# Patient Record
Sex: MALE | Race: WHITE | NOT HISPANIC OR LATINO | Employment: PART TIME | URBAN - METROPOLITAN AREA
[De-identification: names, ages, dates, MRNs, and addresses within clinical notes are randomized per-mention and may not be internally consistent; named-entity substitution may affect disease eponyms.]

---

## 2018-07-26 ENCOUNTER — OFFICE VISIT (OUTPATIENT)
Dept: MEDICAL GROUP | Facility: MEDICAL CENTER | Age: 28
End: 2018-07-26
Attending: FAMILY MEDICINE
Payer: MEDICAID

## 2018-07-26 VITALS
DIASTOLIC BLOOD PRESSURE: 56 MMHG | HEART RATE: 68 BPM | RESPIRATION RATE: 16 BRPM | WEIGHT: 170 LBS | SYSTOLIC BLOOD PRESSURE: 110 MMHG | OXYGEN SATURATION: 96 % | TEMPERATURE: 98.2 F | BODY MASS INDEX: 20.7 KG/M2 | HEIGHT: 76 IN

## 2018-07-26 DIAGNOSIS — Z85.820 HISTORY OF MELANOMA EXCISION: ICD-10-CM

## 2018-07-26 DIAGNOSIS — Z00.00 HEALTHCARE MAINTENANCE: ICD-10-CM

## 2018-07-26 DIAGNOSIS — Z87.438 HISTORY OF HYDROCELE: ICD-10-CM

## 2018-07-26 DIAGNOSIS — F81.9 LEARNING DISABILITY: ICD-10-CM

## 2018-07-26 DIAGNOSIS — Z98.890 HISTORY OF MELANOMA EXCISION: ICD-10-CM

## 2018-07-26 PROCEDURE — 99202 OFFICE O/P NEW SF 15 MIN: CPT | Performed by: FAMILY MEDICINE

## 2018-07-26 PROCEDURE — 99203 OFFICE O/P NEW LOW 30 MIN: CPT | Performed by: FAMILY MEDICINE

## 2018-07-26 ASSESSMENT — PAIN SCALES - GENERAL: PAINLEVEL: NO PAIN

## 2018-07-26 ASSESSMENT — PATIENT HEALTH QUESTIONNAIRE - PHQ9: CLINICAL INTERPRETATION OF PHQ2 SCORE: 0

## 2018-07-27 NOTE — PROGRESS NOTES
Chief Complaint   Patient presents with   • Establish Care         HISTORY OF THE PRESENT ILLNESS: Patient is a 28 y.o. male. This pleasant patient is here today to establish care, and follow-up on previous malignant melanoma with a request to evaluate a new nevus, and learning disability.      History of melanoma excision  Patient underwent excision of a nodular melanoma from his left shoulder in  2007. He had several annual follow-up exams with dermatology in the subsequent 2 years but has not had medical care since. Patient did notice a new nevus on the lateral right ankle about 6 months ago. It has not changed in size shape or color. His weight is stable. There are no skin rashes.    Learning disability  Patient was diagnosed at approximately age 5 with a learning disability. This resulted in him being declared disabled.. He has some trouble with language and speaking but can clearly use words in sentences to express himself. He is currently working at [employer]      Social history-patient lives with his parents, is working in  Allergies: Patient has no known allergies.    No current [facility]-ordered outpatient prescriptions on file.     No current [facility]-ordered facility-administered medications on file.        Past Medical History:   Diagnosis Date   • Learning disability 5 th grade   • Nodular melanoma (HCC) 2007       Past Surgical History:   Procedure Laterality Date   • HYDROCELECTOMY CHILD     • TONSILLECTOMY         Social History   Substance Use Topics   • Smoking status: Never Smoker   • Smokeless tobacco: Never Used   • Alcohol use Yes      Comment: occassionally       Family Status   Relation Status   • Fa (Not Specified)   • PUnc (Not Specified)   • PGMo (Not Specified)   • PGFa (Not Specified)   • PUnc (Not Specified)   • Neg Hx (Not Specified)     Family History   Problem Relation Age of Onset   • Heart Disease Father    • Cancer Paternal Uncle 26        testicular   • Cancer Paternal  "Grandmother         lung   • Heart Disease Paternal Grandmother    • Cancer Paternal Grandfather         prostate   • Stroke Paternal Grandfather    • Cancer Paternal Uncle         liver   • Heart Disease Paternal Uncle    • Diabetes Neg Hx        Review of Systems   Constitutional: Negative for fever, chills, weight loss and malaise/fatigue.   HENT: Negative for ear pain, nosebleeds, congestion, sore throat and neck pain.    Eyes: Negative for blurred vision.   Respiratory: Negative for cough, sputum production, shortness of breath and wheezing.    Cardiovascular: Negative for chest pain, palpitations, orthopnea and leg swelling.   Gastrointestinal: Negative for heartburn, nausea, vomiting and abdominal pain.   Genitourinary: Negative for dysuria, urgency and frequency.   Musculoskeletal: Negative for myalgias, back pain and joint pain.   Skin: Negative for rash and itching.   Neurological: Negative for dizziness, tingling, tremors, sensory change, focal weakness and headaches.   Endo/Heme/Allergies: Does not bruise/bleed easily.   Psychiatric/Behavioral: Negative for depression, anxiety, or memory loss.            Exam: Blood pressure 110/56, pulse 68, temperature 36.8 °C (98.2 °F), resp. rate 16, height 1.93 m (6' 4\"), weight 77.1 kg (170 lb), SpO2 96 %.  General: Normal appearing. No distress.  HEENT: Normocephalic. Eyes conjunctiva clear lids without ptosis, pupils equal and reactive to light accommodation, ears normal shape and contour, canals are clear bilaterally, tympanic membranes are benign, nasal mucosa benign, oropharynx is without erythema, edema or exudates.   Neck: Supple without JVD or bruit. Thyroid is not enlarged.  Pulmonary: Clear to ausculation.  Normal effort. No rales, ronchi, or wheezing.  Cardiovascular: Regular rate and rhythm without murmur. Carotid and radial pulses are intact and equal bilaterally.  Abdomen: Soft, nontender, nondistended. Normal bowel sounds. Liver and spleen are not " palpable  Neurologic: Intact light touch and strength bilaterally,normal speech, no tremor, normal gait.   Lymph: No cervical, supraclavicular or axillary lymph nodes are palpable  Skin: Warm and dry. Lateral right ankle below the malleolus notable for a 3-4 mm oval sharply marginated dark brown flat nevus (reports size stable ×6 months). Other similar scattered nevi are noted over the arms and trunk  Musculoskeletal: Normal gait. No extremity cyanosis, clubbing, or edema.  Psych: Normal mood and affect. Alert and oriented x3. Judgment and insight is normal.    Please note that this dictation was created using voice recognition software. I have made every reasonable attempt to correct obvious errors, but I expect that there are errors of grammar and possibly content that I did not discover before finalizing the note.      Assessment/Plan  1. Healthcare maintenance  COMP METABOLIC PANEL    CBC WITH DIFFERENTIAL    LIPID PROFILE   2. History of melanoma excision     3. History of hydrocele     4. Learning disability       Plan: 1. Fasting lipids, CMP, CBC  2. Discussed close outpatient monitoring of all of his moles looking for irregular shape, rapid growth, change in color, bleeding

## 2018-07-27 NOTE — ASSESSMENT & PLAN NOTE
Patient underwent excision of a nodular melanoma from his left shoulder in  2007. He had several annual follow-up exams with dermatology in the subsequent 2 years but has not had medical care since. Patient did notice a new nevus on the lateral right ankle about 6 months ago. It has not changed in size shape or color. His weight is stable. There are no skin rashes.

## 2019-04-30 ENCOUNTER — OFFICE VISIT (OUTPATIENT)
Dept: MEDICAL GROUP | Facility: MEDICAL CENTER | Age: 29
End: 2019-04-30
Attending: FAMILY MEDICINE
Payer: MEDICAID

## 2019-04-30 VITALS
HEART RATE: 60 BPM | BODY MASS INDEX: 20.82 KG/M2 | WEIGHT: 171 LBS | DIASTOLIC BLOOD PRESSURE: 64 MMHG | TEMPERATURE: 98.6 F | OXYGEN SATURATION: 99 % | RESPIRATION RATE: 16 BRPM | HEIGHT: 76 IN | SYSTOLIC BLOOD PRESSURE: 100 MMHG

## 2019-04-30 DIAGNOSIS — Z23 NEED FOR TDAP VACCINATION: ICD-10-CM

## 2019-04-30 DIAGNOSIS — S80.211A ABRASION OF RIGHT KNEE, INITIAL ENCOUNTER: ICD-10-CM

## 2019-04-30 PROCEDURE — 90715 TDAP VACCINE 7 YRS/> IM: CPT

## 2019-04-30 PROCEDURE — 99213 OFFICE O/P EST LOW 20 MIN: CPT | Performed by: FAMILY MEDICINE

## 2019-04-30 RX ORDER — SILVER SULFADIAZINE 1 %
CREAM (GRAM) TOPICAL
COMMUNITY
Start: 2019-04-30 | End: 2022-02-04

## 2019-04-30 ASSESSMENT — PATIENT HEALTH QUESTIONNAIRE - PHQ9: CLINICAL INTERPRETATION OF PHQ2 SCORE: 0
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
## 2019-05-16 ENCOUNTER — OFFICE VISIT (OUTPATIENT)
Dept: MEDICAL GROUP | Facility: MEDICAL CENTER | Age: 29
End: 2019-05-16
Attending: FAMILY MEDICINE
Payer: MEDICAID

## 2019-05-16 VITALS
DIASTOLIC BLOOD PRESSURE: 66 MMHG | RESPIRATION RATE: 16 BRPM | OXYGEN SATURATION: 96 % | HEART RATE: 64 BPM | HEIGHT: 76 IN | BODY MASS INDEX: 20.09 KG/M2 | WEIGHT: 165 LBS | SYSTOLIC BLOOD PRESSURE: 102 MMHG | TEMPERATURE: 96.9 F

## 2019-05-16 DIAGNOSIS — S80.211S: ICD-10-CM

## 2019-05-16 PROCEDURE — 99213 OFFICE O/P EST LOW 20 MIN: CPT | Performed by: FAMILY MEDICINE
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
## 2019-08-26 ENCOUNTER — NON-PROVIDER VISIT (OUTPATIENT)
Dept: MEDICAL GROUP | Facility: MEDICAL CENTER | Age: 29
End: 2019-08-26
Attending: FAMILY MEDICINE
Payer: MEDICAID

## 2019-08-26 DIAGNOSIS — Z11.1 ENCOUNTER FOR PPD SKIN TEST READING: ICD-10-CM

## 2019-08-26 DIAGNOSIS — Z11.1 PPD SCREENING TEST: ICD-10-CM

## 2019-08-26 PROCEDURE — 86580 TB INTRADERMAL TEST: CPT

## 2019-08-26 PROCEDURE — 96372 THER/PROPH/DIAG INJ SC/IM: CPT | Performed by: FAMILY MEDICINE

## 2019-08-26 NOTE — NON-PROVIDER
Dany Hidalgo is a 29 y.o. male here for a non-provider visit for PPD placement -- Step 1 of 1    Reason for PPD:  work requirement    1. TB evaluation questionnaire completed by patient? Yes      -  If any answers marked yes did you contact a provider prior to placing? Not Indicated  2.  Patient notified to return to clinic for reading on: 08/28/2019 - 08/29/2019  3.  PPD Placement documentation completed on TB evaluation questionnaire? Yes  4.  Location of TB evaluation questionnaire filed:

## 2019-08-28 ENCOUNTER — NON-PROVIDER VISIT (OUTPATIENT)
Dept: MEDICAL GROUP | Facility: MEDICAL CENTER | Age: 29
End: 2019-08-28
Attending: FAMILY MEDICINE
Payer: MEDICAID

## 2019-08-28 LAB — TB WHEAL 3D P 5 TU DIAM: NORMAL MM

## 2019-08-29 NOTE — NON-PROVIDER
Dany Hidalgo is a 29 y.o. male here for a non-provider visit for PPD reading -- Step 1 of 1.      1.  Resulted in Epic under enter/edit results? Yes   2.  TB evaluation questionnaire scanned into chart and original given to patient?Yes      3. Was induration greater than 0 mm? No.    Routed to PCP? Yes

## 2021-09-11 ENCOUNTER — NON-PROVIDER VISIT (OUTPATIENT)
Dept: URGENT CARE | Facility: CLINIC | Age: 31
End: 2021-09-11

## 2021-09-11 DIAGNOSIS — Z11.1 PPD SCREENING TEST: ICD-10-CM

## 2021-09-11 PROCEDURE — 99999 PR NO CHARGE: CPT | Performed by: NURSE PRACTITIONER

## 2021-09-11 PROCEDURE — 86580 TB INTRADERMAL TEST: CPT | Performed by: NURSE PRACTITIONER

## 2021-09-11 NOTE — NON-PROVIDER
Dany Hidalgo is a 31 y.o. male here for a non-provider visit for PPD placement -- Step 1 of 1    Reason for PPD:  WORK/    1. TB evaluation questionnaire completed by patient? No      -  If any answers marked yes did you contact a provider prior to placing? No  2.  Patient notified to return to clinic for reading on: Monday 9/13/2021 AFTER 2:04PM OR BEFORE 2:04PM Tuesday 9/14/2021  3.  PPD Placement documentation completed on TB evaluation questionnaire? Yes  4.  Location of TB evaluation questionnaire filed: [facility]/ TB PLACED [facility]

## 2021-09-13 ENCOUNTER — NON-PROVIDER VISIT (OUTPATIENT)
Dept: URGENT CARE | Facility: CLINIC | Age: 31
End: 2021-09-13

## 2021-09-13 LAB — TB WHEAL 3D P 5 TU DIAM: NORMAL MM

## 2021-09-13 PROCEDURE — 99999 PR NO CHARGE: CPT | Performed by: NURSE PRACTITIONER

## 2021-09-13 NOTE — NON-PROVIDER
Dany Hidalgo is a 31 y.o. male here for a non-provider visit for PPD reading -- Step 1 of 1.      1.  Resulted in Epic under enter/edit results? Yes   2.  TB evaluation questionnaire scanned into chart and original given to patient?Yes      3. Was induration greater than 0 mm? No.    Routed to PCP? No

## 2022-02-04 ENCOUNTER — OFFICE VISIT (OUTPATIENT)
Dept: MEDICAL GROUP | Facility: CLINIC | Age: 32
End: 2022-02-04

## 2022-02-04 VITALS
HEART RATE: 67 BPM | WEIGHT: 171 LBS | DIASTOLIC BLOOD PRESSURE: 72 MMHG | HEIGHT: 75 IN | SYSTOLIC BLOOD PRESSURE: 102 MMHG | RESPIRATION RATE: 16 BRPM | BODY MASS INDEX: 21.26 KG/M2 | OXYGEN SATURATION: 97 %

## 2022-02-04 DIAGNOSIS — Z00.00 WELL ADULT EXAM: ICD-10-CM

## 2022-02-04 DIAGNOSIS — Z98.890 HISTORY OF MELANOMA EXCISION: ICD-10-CM

## 2022-02-04 DIAGNOSIS — Z85.820 HISTORY OF MELANOMA EXCISION: ICD-10-CM

## 2022-02-04 PROCEDURE — 99385 PREV VISIT NEW AGE 18-39: CPT | Mod: GC | Performed by: STUDENT IN AN ORGANIZED HEALTH CARE EDUCATION/TRAINING PROGRAM

## 2022-02-04 ASSESSMENT — PATIENT HEALTH QUESTIONNAIRE - PHQ9: CLINICAL INTERPRETATION OF PHQ2 SCORE: 0

## 2022-02-04 NOTE — ASSESSMENT & PLAN NOTE
-32 yo old healthy adult  - hx of mild developmental delay  - hx of melanoma s/p excision ~14 years ago  - Patient works at [employer] facility  - Lives with family members  - No tobacco, no recreational drugs, occasional alcohol on weekends  - No other chronic medical problems or takes any medications  - Patient needs annual flu, HepA    - Patient will get UTD on vaccines after he gets his insurance approved   - Patient to f/u with dermatology for skin exam - hx of melanoma

## 2022-02-04 NOTE — PROGRESS NOTES
[facility]     PATIENT ID:  NAME:  Dany Hidalgo  MRN:               [id number]  YOB: 1990    Date: 8:44 AM      Resident: Miguel Sherwood DO    CC:  New patient      HPI: Dany Hidalgo is a 31 y.o. male who presented as a new patient    Problem   Well Adult Exam    -30 yo old healthy adult  - hx of mild developmental delay  - hx of melanoma s/p excision ~14 years ago  - Patient works at [employer] facility  - Lives with family members  - No tobacco, no recreational drugs, occasional alcohol on weekends  - No other chronic medical problems or takes any medications  - Patient needs annual flu, HepA     History of Melanoma Excision    - hx of melanoma, status post excision approximately 14 years ago   -Small skin tag on the patient has not had a skin check in approximately 2 years   -Right ear which she recently noticed cannot   Use cryotherapy today as we do not have it  -No other concerning new moles the patient has noticed  -         REVIEW OF SYSTEMS:   Ten systems reviewed and were negative except as noted in the HPI.                PROBLEM LIST  Patient Active Problem List   Diagnosis   • History of melanoma excision   • Learning disability   • Well adult exam        PAST SURGICAL HISTORY:  Past Surgical History:   Procedure Laterality Date   • HYDROCELECTOMY CHILD     • TONSILLECTOMY         FAMILY HISTORY:  Family History   Problem Relation Age of Onset   • Heart Disease Father    • Cancer Paternal Uncle 26        testicular   • Cancer Paternal Grandmother         lung   • Heart Disease Paternal Grandmother    • Cancer Paternal Grandfather         prostate   • Stroke Paternal Grandfather    • Cancer Paternal Uncle         liver   • Heart Disease Paternal Uncle    • Diabetes Neg Hx        SOCIAL HISTORY:   Social History     Tobacco Use   • Smoking status: Never Smoker   • Smokeless tobacco: Never Used   Substance Use Topics   • Alcohol use: Yes     Comment: occassionally  "      ALLERGIES:  No Known Allergies    OUTPATIENT MEDICATIONS:  No current outpatient medications on file.    PHYSICAL EXAM:  Vitals:    02/04/22 0803   BP: 102/72   BP Location: Right arm   Patient Position: Sitting   BP Cuff Size: Adult   Pulse: 67   Resp: 16   SpO2: 97%   Weight: 77.6 kg (171 lb)   Height: 1.905 m (6' 3\")       General: Pt resting in NAD, cooperative   Skin:  Pink, warm and dry.  HEENT: NC/AT. EOMI.  Lungs:  Symmetrical.  CTAB, good air movement   Cardiovascular:  S1/S2 RRR   Skin: multiple nevi back and chest, small skin tag right ear  Extremities:  Full range of motion.  CNS:  Muscle tone is normal. No gross focal neurologic deficits      ASSESSMENT/PLAN:   31 y.o. male     Problem List Items Addressed This Visit     Well adult exam     -32 yo old healthy adult  - hx of mild developmental delay  - hx of melanoma s/p excision ~14 years ago  - Patient works at [employer] facility  - Lives with family members  - No tobacco, no recreational drugs, occasional alcohol on weekends  - No other chronic medical problems or takes any medications  - Patient needs annual flu, HepA    - Patient will get UTD on vaccines after he gets his insurance approved   - Patient to f/u with dermatology for skin exam - hx of melanoma         History of melanoma excision     - hx of melanoma, status post excision approximately 14 years ago   -Small skin tag on the patient has not had a skin check in approximately 2 years   -Right ear which she recently noticed cannot   Use cryotherapy today as we do not have it  -No other concerning new moles the patient has noticed    -Skin check over trunk, multiple moles, recommend follow-up with dermatology for at least annual skin check based on history of melanoma  -Patient can come back for skin tag removal or can follow-up with dermatology referral to dermatology placed           Relevant Orders    Referral to Dermatology          Miguel Sherwood, DO  PGY-3  [facility] "

## 2022-02-04 NOTE — ASSESSMENT & PLAN NOTE
- hx of melanoma, status post excision approximately 14 years ago   -Small skin tag on the patient has not had a skin check in approximately 2 years   -Right ear which she recently noticed cannot   Use cryotherapy today as we do not have it  -No other concerning new moles the patient has noticed    -Skin check over trunk, multiple moles, recommend follow-up with dermatology for at least annual skin check based on history of melanoma  -Patient can come back for skin tag removal or can follow-up with dermatology referral to dermatology placed

## 2023-09-06 ENCOUNTER — NON-PROVIDER VISIT (OUTPATIENT)
Dept: URGENT CARE | Facility: CLINIC | Age: 33
End: 2023-09-06

## 2023-09-06 DIAGNOSIS — Z11.1 ENCOUNTER FOR PPD TEST: ICD-10-CM

## 2023-09-06 PROCEDURE — 86580 TB INTRADERMAL TEST: CPT | Performed by: STUDENT IN AN ORGANIZED HEALTH CARE EDUCATION/TRAINING PROGRAM

## 2023-09-06 NOTE — PROGRESS NOTES
Dany Hidalgo is a 33 y.o. male here for a non-provider visit for PPD placement -- Step 1 of 1    Reason for PPD:  work requirement    1. TB evaluation questionnaire completed by patient? Yes      -  If any answers marked yes did you contact a provider prior to placing? Not Indicated  2.  Patient notified to return to clinic for reading on: Friday 09/08/23 after 11:09 am or Saturday before 11:09 am    3.  PPD Placement documentation completed on TB evaluation questionnaire? Yes  4.  Location of TB evaluation questionnaire filed:

## 2023-09-08 ENCOUNTER — NON-PROVIDER VISIT (OUTPATIENT)
Dept: URGENT CARE | Facility: CLINIC | Age: 33
End: 2023-09-08

## 2023-09-08 LAB — TB WHEAL 3D P 5 TU DIAM: 0 MM

## 2023-09-08 NOTE — PROGRESS NOTES
Dany Hidalgo is a 33 y.o. male here for a non-provider visit for PPD reading -- Step 1 of 1.      1.  Resulted in Epic under enter/edit results? Yes   2.  TB evaluation questionnaire scanned into chart and original given to patient?Yes      3. Was induration greater than 0 mm? No.      Routed to PCP? Yes

## 2024-05-07 ENCOUNTER — OFFICE VISIT (OUTPATIENT)
Dept: INTERNAL MEDICINE | Facility: OTHER | Age: 34
End: 2024-05-07

## 2024-05-07 VITALS
HEIGHT: 75 IN | TEMPERATURE: 98.1 F | WEIGHT: 166.2 LBS | BODY MASS INDEX: 20.67 KG/M2 | DIASTOLIC BLOOD PRESSURE: 56 MMHG | HEART RATE: 62 BPM | SYSTOLIC BLOOD PRESSURE: 108 MMHG | OXYGEN SATURATION: 96 %

## 2024-05-07 DIAGNOSIS — Z85.820 HISTORY OF MELANOMA: ICD-10-CM

## 2024-05-07 DIAGNOSIS — D48.5 NEOPLASM OF UNCERTAIN BEHAVIOR OF SKIN: ICD-10-CM

## 2024-05-07 PROCEDURE — 3078F DIAST BP <80 MM HG: CPT | Performed by: DERMATOLOGY

## 2024-05-07 PROCEDURE — 3074F SYST BP LT 130 MM HG: CPT | Performed by: DERMATOLOGY

## 2024-05-07 PROCEDURE — 99202 OFFICE O/P NEW SF 15 MIN: CPT | Mod: 25 | Performed by: DERMATOLOGY

## 2024-05-07 PROCEDURE — 99000 SPECIMEN HANDLING OFFICE-LAB: CPT | Performed by: DERMATOLOGY

## 2024-05-07 PROCEDURE — 69100 BIOPSY OF EXTERNAL EAR: CPT | Mod: RT | Performed by: DERMATOLOGY

## 2024-05-07 RX ADMIN — Medication 20 ML: at 12:00

## 2024-05-07 NOTE — PATIENT INSTRUCTIONS
Biopsy care R ear    Wash area with soap and water daily.   Apply vaseline and keep covered with bandaid daily until spot is healed.   Call if problems with healing such as redness, warmth, drainage, pain.

## 2024-05-07 NOTE — PROGRESS NOTES
"Dany Hidalgo  1990  [id number]    Consultation             Vitals:    05/07/24 1100   BP: 108/56   BP Location: Left arm   Patient Position: Sitting   BP Cuff Size: Small adult   Pulse: 62   Temp: 36.7 °C (98.1 °F)   TempSrc: Temporal   SpO2: 96%   Weight: 75.4 kg (166 lb 3.2 oz)   Height: 1.905 m (6' 3\")       Chief Complaint   Patient presents with    [facility]     Hx of skin cancer - last since he was 17 years old - pt has skin tag on right side of ear - unaware of completely how long; no pain, annoying, no discharge     ___________________________________________________________________            History of Present Illness (HPI) h/o melanoma about 20 years ago.   Pt says he had a melanoma excised when he was 17 years old.  Dr. Villanueva note from [facility] states nodular melanoma excision in 2007.   Pt c/o spot on R ear x a while. Growing bigger  No prior rx.            Dr. Hill has referred for a consultation to evaluate spot    No current outpatient medications on file prior to visit.     No current facility-administered medications on file prior to visit.     Patient has no known allergies.      Review of Systems:        General: Denies fever      Hematologic: no excessive bleeding problems              Past Medical History:   Diagnosis Date    Learning disability 5 th grade    Nodular melanoma (HCC) 2007     Skin Cancer h/o melanoma     Social History     Tobacco Use    Smoking status: Never    Smokeless tobacco: Never   Vaping Use    Vaping Use: Never used   Substance Use Topics    Alcohol use: Yes     Comment: occassionally    Drug use: No     Sunscreen Use:Yes    Past Surgical History:   Procedure Laterality Date    HYDROCELECTOMY CHILD      TONSILLECTOMY             Family History   Problem Relation Age of Onset    Heart Disease Father     Cancer Paternal Uncle 26        testicular    Cancer Paternal Grandmother         lung    Heart Disease Paternal Grandmother     Cancer " Paternal Grandfather         prostate    Stroke Paternal Grandfather     Cancer Paternal Uncle         liver    Heart Disease Paternal Uncle     Diabetes Neg Hx          Physical Exam:   Constitutional: Well nourished, NAD, pleasant  alert and cooperative; normal mood and affect; normal attention span and concentration.    Skin   R post ear upper 8 mm brown verrucous pap  L shoulder medial upper RR track spread scar                Assessment and Plan:   1. History of melanoma  Scar on L shoulder well healed. No signs of recurrence.   Continue self exam and sun protection.   Skin check 1 month at next visit    2. Neoplasm of uncertain behavior of skin  Photo taken with pt's cell phone   R post ear r/o SCC vs ISK   SHAVE BIOPSY    Informed consent. area was cleansed with Alcohol. The lesion area was infiltrated with    Lidocaine 1% . flexible blade to remove specimen. Hemostasis with aluminum chloride. Bandaid applied. Wound care instructions were given orally.    - Pathology Specimen; Future    Kae Gilliam M.D.   Return in about 2 months (around 7/7/2024).

## 2024-05-14 DIAGNOSIS — D48.5 NEOPLASM OF UNCERTAIN BEHAVIOR OF SKIN: ICD-10-CM

## 2024-05-16 ENCOUNTER — TELEPHONE (OUTPATIENT)
Dept: INTERNAL MEDICINE | Facility: OTHER | Age: 34
End: 2024-05-16

## 2024-05-16 NOTE — TELEPHONE ENCOUNTER
Phone Number Called: [phone] (home) [phone] (work)    Call outcome:  Spoke to Brooke, pt's mother    Message: Relayed pt's biopsy results with mom. No questions at this time, closing enc

## 2024-07-09 ENCOUNTER — OFFICE VISIT (OUTPATIENT)
Dept: INTERNAL MEDICINE | Facility: OTHER | Age: 34
End: 2024-07-09
Payer: MEDICAID

## 2024-07-09 VITALS
SYSTOLIC BLOOD PRESSURE: 121 MMHG | TEMPERATURE: 96.9 F | HEIGHT: 75 IN | HEART RATE: 70 BPM | WEIGHT: 167.4 LBS | OXYGEN SATURATION: 97 % | BODY MASS INDEX: 20.81 KG/M2 | DIASTOLIC BLOOD PRESSURE: 75 MMHG

## 2024-07-09 DIAGNOSIS — D18.01 HEMANGIOMA OF SKIN: ICD-10-CM

## 2024-07-09 DIAGNOSIS — L55.9 SUNBURN: ICD-10-CM

## 2024-07-09 DIAGNOSIS — Z85.820 HISTORY OF MELANOMA: ICD-10-CM

## 2024-07-09 DIAGNOSIS — L81.4 LENTIGO: ICD-10-CM

## 2024-07-09 PROCEDURE — 3074F SYST BP LT 130 MM HG: CPT | Performed by: DERMATOLOGY

## 2024-07-09 PROCEDURE — 99213 OFFICE O/P EST LOW 20 MIN: CPT | Performed by: DERMATOLOGY

## 2024-07-09 PROCEDURE — 3078F DIAST BP <80 MM HG: CPT | Performed by: DERMATOLOGY
